# Patient Record
Sex: MALE | Race: WHITE | Employment: OTHER | URBAN - METROPOLITAN AREA
[De-identification: names, ages, dates, MRNs, and addresses within clinical notes are randomized per-mention and may not be internally consistent; named-entity substitution may affect disease eponyms.]

---

## 2019-06-18 ENCOUNTER — APPOINTMENT (OUTPATIENT)
Dept: GENERAL RADIOLOGY | Age: 69
End: 2019-06-18
Payer: MEDICARE

## 2019-06-18 ENCOUNTER — HOSPITAL ENCOUNTER (EMERGENCY)
Age: 69
Discharge: HOME | End: 2019-06-19
Attending: EMERGENCY MEDICINE
Payer: MEDICARE

## 2019-06-18 DIAGNOSIS — S61.311A LACERATION OF LEFT INDEX FINGER WITH DAMAGE TO NAIL, FOREIGN BODY PRESENCE UNSPECIFIED, INITIAL ENCOUNTER: Primary | ICD-10-CM

## 2019-06-18 PROCEDURE — 73130 X-RAY EXAM OF HAND: CPT

## 2019-06-18 PROCEDURE — 99283 EMERGENCY DEPT VISIT LOW MDM: CPT

## 2019-06-18 RX ORDER — CEPHALEXIN 500 MG/1
500 CAPSULE ORAL 3 TIMES DAILY
Qty: 30 CAPSULE | Refills: 0 | Status: SHIPPED | OUTPATIENT
Start: 2019-06-18 | End: 2019-06-28

## 2019-06-18 ASSESSMENT — ENCOUNTER SYMPTOMS
BACK PAIN: 0
VOMITING: 0
ABDOMINAL PAIN: 0
WHEEZING: 0
CONSTIPATION: 0
SHORTNESS OF BREATH: 0
NAUSEA: 0
SORE THROAT: 0
BLOOD IN STOOL: 0
COUGH: 0
DIARRHEA: 0

## 2019-06-19 VITALS
DIASTOLIC BLOOD PRESSURE: 93 MMHG | RESPIRATION RATE: 16 BRPM | WEIGHT: 266 LBS | HEART RATE: 72 BPM | SYSTOLIC BLOOD PRESSURE: 156 MMHG | HEIGHT: 76 IN | OXYGEN SATURATION: 91 % | BODY MASS INDEX: 32.39 KG/M2 | TEMPERATURE: 98.1 F

## 2019-06-19 NOTE — ED PROVIDER NOTES
treatment plan and disposition of the patient as recorded by the APC.     Angelina Duran MD  Attending Emergency  Physician        Denisse Garcia MD  06/18/19 [id number]

## 2019-06-19 NOTE — ED PROVIDER NOTES
[facility]  ED  Emergency Department Encounter  EmergencyMedicine Resident     Pt Name:Emanuel Hatch  MRN: [id number]  [place] 1950  Date of evaluation: 19  PCP:  Rei Pratt DO    CHIEF COMPLAINT       Chief Complaint   Patient presents with    Laceration     right hand index finger, previously seen at [facility], told to come here because the tendon was severed        HISTORY OF PRESENT ILLNESS  (Location/Symptom, Timing/Onset, Context/Setting, Quality, Duration, Modifying Factors, Severity.)      Kerwin Ann is a 80 y.o. male who presents with a complaint that he was transferred here from [facility] as he has a left index finger partial extensor tendon laceration this was noticed while the attending at the facility was repairing a laceration. Denies any other injuries. PAST MEDICAL / SURGICAL / SOCIAL / FAMILY HISTORY      has a past medical history of Asthma, Cerebrovascular disease, Depression, Hyperlipidemia, Hypertension, Osteoarthritis, Renal stone, Stroke ([facility] [address] and Transfusion history. has a past surgical history that includes Lumbar spine surgery (); Lithotripsy; Kidney stone surgery; and Colonoscopy.     Social History     Socioeconomic History    Marital status:      Spouse name: Not on file    Number of children: Not on file    Years of education: Not on file    Highest education level: Not on file   Occupational History    Occupation: [address]: disabled    Social Needs    Financial resource strain: Not on file    Food insecurity:     Worry: Not on file     Inability: Not on file   Polyvore needs:     Medical: Not on file     Non-medical: Not on file   Tobacco Use    Smoking status: Former Smoker     Packs/day: 2.00     Last attempt to quit: 1990     Years since quittin.4    Smokeless tobacco: Current User     Types: Snuff   Substance and Sexual Activity    Alcohol use: No    Drug use: No    Sexual activity: Not on file   Lifestyle    Physical activity:     Days per week: Not on file     Minutes per session: Not on file    Stress: Not on file   Relationships    Social connections:     Talks on phone: Not on file     Gets together: Not on file     Attends Gnosticism service: Not on file     Active member of club or organization: Not on file     Attends meetings of clubs or organizations: Not on file     Relationship status: Not on file    Intimate partner violence:     Fear of current or ex partner: Not on file     Emotionally abused: Not on file     Physically abused: Not on file     Forced sexual activity: Not on file   Other Topics Concern    Not on file   Social History Narrative    Not on file       Family History   Problem Relation Age of Onset    Diabetes Mother     Arthritis Father     Dementia Father        Allergies:  Patient has no known allergies. Home Medications:  Prior to Admission medications    Medication Sig Start Date End Date Taking? Authorizing Provider   cephALEXin (KEFLEX) 500 MG capsule Take 1 capsule by mouth 3 times daily for 10 days 6/18/19 6/28/19 Yes Renetta Prior, DO   simvastatin (ZOCOR) 40 MG tablet TAKE ONE TABLET EVERY EVENING. 1/15/16  Yes Devi Gavin MD   HYDROcodone-acetaminophen (NORCO) 5-325 MG per tablet Take 1 tablet by mouth every 6 hours as needed for Pain. Yes Historical Provider, MD   tamsulosin (FLOMAX) 0.4 MG capsule Take 0.4 mg by mouth daily. Yes Historical Provider, MD   furosemide (LASIX) 40 MG tablet Take 40 mg by mouth daily. 3/2/13  Yes Historical Provider, MD   aspirin 81 MG EC tablet Take 81 mg by mouth every other day. Yes Historical Provider, MD   citalopram (CELEXA) 20 MG tablet Take 20 mg by mouth daily. Yes Historical Provider, MD   alprazolam Yang Thompson) 0.5 MG tablet Take 0.5 mg by mouth 3 times daily. Yes Historical Provider, MD   gabapentin (NEURONTIN) 600 MG tablet Take 600 mg by mouth See Admin Instructions.  2 tablets every day   Yes Historical Provider, MD   folic acid (FOLVITE) 1 MG tablet TAKE ONE TABLET TWICE A DAY. 1/15/16   Travon Joseph MD   amLODIPine-benazepril (LOTREL) 5-10 MG per capsule Take 1 capsule by mouth daily    Historical Provider, MD   budesonide-formoterol [facility]) 160-4.5 MCG/ACT AERO Inhale 2 puffs into the lungs 2 times daily 4/24/15 9/15/15  Travon Joseph MD   montelukast (SINGULAIR) 10 MG tablet Take 10 mg by mouth nightly. Historical Provider, MD   vitamin B-12 (CYANOCOBALAMIN) 1000 MCG tablet Take 1,000 mcg by mouth daily. Historical Provider, MD   Pyridoxine HCl (B-6) 250 MG TABS Take  by mouth daily. Historical Provider, MD       REVIEW OF SYSTEMS    (2-9 systems for level 4, 10 or more for level 5)      Review of Systems   Constitutional: Negative for diaphoresis and fever. HENT: Negative for sore throat. Respiratory: Negative for cough, shortness of breath and wheezing. Cardiovascular: Negative for chest pain, palpitations and leg swelling. Gastrointestinal: Negative for abdominal pain, blood in stool, constipation, diarrhea, nausea and vomiting. Genitourinary: Negative for dysuria, frequency and hematuria. Musculoskeletal: Negative for back pain and neck pain. Skin: Positive for wound. Negative for rash. Neurological: Negative for dizziness and headaches. Hematological: Does not bruise/bleed easily. PHYSICAL EXAM   (up to 7 for level 4, 8 or more for level 5)      INITIAL VITALS:   BP (!) 156/93   Pulse 72   Temp 98.1 °F (36.7 °C) (Oral)   Ht 6' 3.5\" (1.918 m)   Wt 266 lb (120.7 kg)   SpO2 91%   BMI 32.81 kg/m²     Physical Exam   Constitutional: He is oriented to person, place, and time. He appears well-developed and well-nourished. No distress. HENT:   Head: Normocephalic and atraumatic. Nose: Nose normal.   Neck: Normal range of motion. Cardiovascular: Normal rate and regular rhythm.    Pulmonary/Chest: Effort normal and breath sounds normal.   Abdominal: He exhibits no distension. Musculoskeletal: He exhibits no tenderness or deformity. Laceration repair of left index finger, patient has full range of motion of left index finger is neuro intact with good capillary refill. Neurological: He is alert and oriented to person, place, and time. Skin: Skin is warm and dry. He is not diaphoretic. No erythema. Psychiatric: He has a normal mood and affect. His behavior is normal.       DIFFERENTIAL  DIAGNOSIS     PLAN (LABS / IMAGING / EKG):  Orders Placed This Encounter   Procedures    XR HAND LEFT (MIN 3 VIEWS)    Inpatient consult to Plastic Surgery       MEDICATIONS ORDERED:  Orders Placed This Encounter   Medications    cephALEXin (KEFLEX) 500 MG capsule     Sig: Take 1 capsule by mouth 3 times daily for 10 days     Dispense:  30 capsule     Refill:  0         DIAGNOSTIC RESULTS / EMERGENCY DEPARTMENT COURSE / MDM     LABS:  No results found for this visit on 06/18/19. RADIOLOGY:     Xr Hand Left (min 3 Views)    Result Date: 6/18/2019  EXAMINATION: 3 XRAY VIEWS OF THE LEFT HAND 6/18/2019 11:21 pm COMPARISON: None. HISTORY: ORDERING SYSTEM PROVIDED HISTORY: laceration TECHNOLOGIST PROVIDED HISTORY: laceration Ordering Physician Provided Reason for Exam: laceration to anterior left index finger FINDINGS: Three views of the left hand demonstrate no acute fracture or dislocation. Severe degenerative changes of the proximal interphalangeal joint of the 2nd digit with ulnar deviation of the distal 2nd digit. Findings appear to likely be chronic given the osteoarthritic changes at the PIP joint. Severe degenerative changes at the 3rd metacarpophalangeal joint and mild degenerative changes of the 1st metacarpophalangeal joint. Mild-to-moderate degenerative changes of the 1st [address] 16 joint and triscaphe joint. There is soft tissue swelling of the 2nd digit. No radiopaque foreign body identified within the soft tissues. Take 1 capsule by mouth 3 times daily for 10 days       Renetta Hall,   Emergency Medicine Resident    (Please note that portions of this note were completed with a voicerecognition program.  Efforts were made to edit the dictations but occasionally words are mis-transcribed.)        Renetta Hall DO  06/18/19 1511

## 2019-06-19 NOTE — ED NOTES
Pt. Arrives to ED via private auto as transfer from [facility] ER for c/o finger laceration to left 2nd digit that pt. Sustained from a knife while cutting through weed lupe cording. Pt. Received keflex and tetanus PTA. Pt. Denies any pain at this time. Pt. Sent for plastics/hand evaluation due to possible tendon involvement. Dressing intact PTA.        Maud Sever, RN  06/18/19 [id number]

## 2020-10-29 PROBLEM — G89.29 LUMBOSACRAL PAIN, CHRONIC: Status: ACTIVE | Noted: 2020-10-29

## 2020-10-29 PROBLEM — M54.50 LUMBOSACRAL PAIN, CHRONIC: Status: ACTIVE | Noted: 2020-10-29

## 2021-01-06 PROBLEM — M51.36 DDD (DEGENERATIVE DISC DISEASE), LUMBAR: Status: ACTIVE | Noted: 2021-01-06

## 2021-01-06 PROBLEM — M96.1 POSTLAMINECTOMY SYNDROME, LUMBAR REGION: Status: ACTIVE | Noted: 2021-01-06

## 2021-01-06 PROBLEM — M47.817 LUMBOSACRAL SPONDYLOSIS WITHOUT MYELOPATHY: Status: ACTIVE | Noted: 2021-01-06

## 2021-08-23 PROBLEM — Z79.899 OTHER LONG TERM (CURRENT) DRUG THERAPY: Status: ACTIVE | Noted: 2020-08-07

## 2021-08-23 PROBLEM — I10 ESSENTIAL (PRIMARY) HYPERTENSION: Status: ACTIVE | Noted: 2020-08-07

## 2022-05-18 PROBLEM — M53.3 SACROILIAC PAIN: Chronic | Status: ACTIVE | Noted: 2022-05-18

## 2022-05-18 PROBLEM — G57.02 PIRIFORMIS SYNDROME OF LEFT SIDE: Chronic | Status: ACTIVE | Noted: 2022-05-18

## 2022-07-06 PROBLEM — M79.18 PIRIFORMIS MUSCLE PAIN: Chronic | Status: ACTIVE | Noted: 2022-07-06

## 2022-08-29 PROBLEM — M47.816 LUMBAR SPONDYLOSIS: Status: ACTIVE | Noted: 2022-08-29
